# Patient Record
(demographics unavailable — no encounter records)

---

## 2024-11-18 NOTE — ASSESSMENT
[FreeTextEntry1] : Reviewed at length with patient severity of arthritis and at this time she will be referred to a dedicated hip and knee replacement specialist and consideration to possible total knee replacement

## 2024-11-18 NOTE — HISTORY OF PRESENT ILLNESS
[de-identified] : Last visit: 6/19/2024 Symptoms: Buckling  / sharp Pain level: 6/10 Physical therapy/ Home exercises: none Patient states some improvement the past with cortisone injection but at this point would like to consider knee replacements

## 2024-11-18 NOTE — PHYSICAL EXAM
[de-identified] : Right knee  Constitutional:  The patient is healthy-appearing and in no apparent distress.  Patient is overweight  Gait: The patient ambulates with a normal gait and no limp.  Cardiovascular System:  The capillary refill is less than 2 seconds.   Skin:  There are no skin abnormalities.  Right Knee:   Bony Palpation:  There is tenderness of the medial joint line.  There is tenderness of the lateral joint line. There is tenderness of the medial femoral chondyle. There is tenderness of the lateral femoral chondyle. There is no tenderness of the tibial tubercle. There is no tenderness of the superior patella. There is no tenderness of the inferior patella. There is tenderness of the medial patellar facet. There is tenderness of the lateral patellar facet.  Soft Tissue Palpation:  There is no tenderness of the medial retinaculum. There is no tenderness of the lateral retinaculum. There is no tenderness of the quadriceps tendon. There is no tenderness of the patella tendon. There is no tenderness of the ITB. There is no tenderness of the pes anserine.  Active Range of Motion:  The range of motion at the knee actively and passively is 3 - 125.   Special Tests:  There is a negative Apley. There is a negative Steinmanns.  There is a negative Lachman and Anterior Drawer. There is a negative Posterior Drawer.   There is no varus or valgus laxity.  Strength:  There is 5/5 hip flexion and 5/5 knee flexion and extension.    Psychiatric:  The patient demonstrates a normal mood and affect and is active and alert

## 2024-11-22 NOTE — HISTORY OF PRESENT ILLNESS
[de-identified] : First-time visit for this patient otherwise healthy 63-year-old female she is referred to us by another orthopedic surgeon here at [facility] for evaluation of her right knee possible knee replacement surgery.  Patient reports a chronic level of high pain currently worsening on the lateral aspect of the right knee.  She also notices that the right lower extremity becoming knock kneed.  Patient was told that she should consider knee replacement surgery for surgical correction of the knock kneed nature of the knee as well as pain reduction.  She has not seen any sustained symptomatic improvement with previous conservative measures such as physical therapy and anti-inflammatory use.

## 2024-11-22 NOTE — DISCUSSION/SUMMARY
[de-identified] : Patient I reviewed directly the radiographic images from today.  She is able to appreciate the lack of cartilage on the lateral aspect of the right knee with the early secondary findings of osteophyte formation also noted.  We talked at length about knee replacement surgery including typical convalescent expectations expectations for implant longevity.  We talked to the patient about how the fact that her BMI is just over 40 can cause potential increase in perioperative complications and also have a negative impact on implant longevity.  Patient has decided that she can no longer tolerate the hide level of pain and is willing to move forward with the surgery despite these increased potential complications due to high BMI.  We also reviewed the patient our implant selection criteria as well as our surgical approach.  Surgical risks reviewed. The reasonable risks and benefits of knee replacement surgery discussed in detail with the patient. Patient asked appropriate questions which were answered to their satisfaction. We talked about potential complications including complications they may require revision surgery such as component loosening failure migration wear and also potential complications of infection and stiffness.  Patient was advised once she sets a tentative surgery date in January she will return to the office 1 to 2 weeks before the surgery to review expectations for the surgery and convalescence.   Today's consultation regarding the patient's decision to move forward with elective right knee replacement surgery lasted 45 minutes.

## 2024-11-22 NOTE — REASON FOR VISIT
[Workers' Comp: Date of Injury: _____] : This visit is related to worker's compensation. Date of Injury: [unfilled] [Knee Pain] : knee pain [FreeTextEntry2] : RIGHT KNEE PAIN. PREVIOUSLY SAW DR DOSS WHO REFERRED HER. SHE WANTS TO TALK ABOUT POSSIBLE KNEE REPLACEMENT.